# Patient Record
Sex: FEMALE | Race: WHITE | Employment: PART TIME | URBAN - METROPOLITAN AREA
[De-identification: names, ages, dates, MRNs, and addresses within clinical notes are randomized per-mention and may not be internally consistent; named-entity substitution may affect disease eponyms.]

---

## 2021-11-03 NOTE — PROGRESS NOTES
[address] [facility]  [phone]    Hematology / Oncology Consult    Reason for Visit:   Wing Kohler is a 49 y.o. female who is seen in consultation at the request of Dr. Netta Correa for evaluation of hypercoag workup. PCP is Dr. Angela Henriquez. History of Present Illness:   Wing Kohler is a 49 y.o. female who is seen for PE and hypercoag workup. Patient had left-sided lateral chest pain and left shoulder pain with pleuritic pattern. She was evaluated by her PCP who ordered chest CTA. Patient was diagnosed with a pulmonary emboli in RLL and LLL on 8/10/21 and was started on Xarelto. Reportedly a hypercoag workup was completed and was abnormal but done while on Xarelto. She was on oral contraceptives at time of PE diagnosis, but has since discontinued. No 8 hr long travel, surgeries, fractures or hospitalizations in the 6-8 weeks prior to diagnosis. No prior personal or family h/o VTE. She is very active during work. Of note, she was diagnosed with Celiac disease in 2017 and feeling much better after switching to gluten free diet. PMHx: Celiac disease  PSurgHx: None  FHx: No h/o VTE or clotting disorders. Father had prostate cancer. Mother gluten intolerant. SHx: Non smoker, occasional EtOH. No past medical history on file. No past surgical history on file. Social History     Tobacco Use    Smoking status: Never Smoker    Smokeless tobacco: Never Used   Substance Use Topics    Alcohol use: Not Currently     Comment: occassionally      No family history on file. No current outpatient medications on file. No current facility-administered medications for this visit. Allergies   Allergen Reactions    Gluten Other (comments)     Celiac Disease unable to contain medications or products containing gluten.  Penicillins Hives        Review of Systems: A complete review of systems was obtained, negative except as described above.     Physical Exam: Blood pressure (!) 138/91, pulse 77, temperature 97 °F (36.1 °C), resp. rate 16, height 5' 5\" (1.651 m), weight 182 lb 3.2 oz (82.6 kg), SpO2 98 %. ECOG PS: 0  General: Well developed, no acute distress  Eyes: PERRLA, EOMI, anicteric sclerae  HENT: Atraumatic, OP clear, TMs intact without erythema  Neck: Supple, no JVD or thyoemgaly  Lymphatic: No cervical, supraclavicular, axillary adenopathy  Respiratory: CTAB, normal respiratory effort  CV: Normal rate, regular rhythm, no murmurs, no peripheral edema  GI: Soft, nontender, nondistended, no masses, no hepatomegaly, no splenomegaly  MS: Normal gait and station. Digits without clubbing or cyanosis. Skin: No rashes, ecchymoses, or petechiae. Normal temperature, turgor, and texture. Neuro/Psych: Alert, oriented. 5/5 strength in all 4 extremities. Appropriate affect, normal judgment/insight. Results:   No results found for: WBC, HGB, HCT, PLT, MCV, ANEU, HGBPOC, HCTPOC, HGBEXT, HCTEXT, PLTEXT  No results found for: NA, K, CL, CO2, GLU, BUN, CREA, GFRAA, GFRNA, CA, NAPOC, KPOCT, CLPOC, GLUCPOC, IBUN, CREAPOC, ICAI  No results found for: TBILI, ALT, AP, TP, ALB, GLOB  No results found for: IRON, FE, TIBC, IBCT, PSAT, FERR    No results found for: B12LT, FOL, RBCF  No results found for: TSH, TSH2, TSH3, TSHP, TSHEXT  No results found for: HAMAT, HAAB, HABT, HAAT, HBSAG, HBSB, HBSAT, HBABN, HBCM, HBCAB, HBCAT, XBCABS, HBEAB, HBEAG, XHEPCS, [id number], HBEGLT, HBCMLT, HBCLT, HBEBLT, [id number], [id number], HAVMLT, [id number], HBCMLT, [id number], HCGAT    Outside labs 9/14/21:  Antithrombin %   []  Protein C 160%   [73 - 180%]  Protein S 84%   [%]  Factor V Leiden: Single [id number] mutation detected (Heterozygote)  Factor II / Prothrombin gene mutation negative  Lupus anticoagulant positive  Anticardiolipin negative  Beta-2-GP negative      Imaging: Outside chest CT 8/10/21: Pulmonary emboli in RLL and LLL. No right heart strain.       Assessment & Plan: Chu Floyd is a 49 y.o. female is seen for evaluation of PE and thrombophilia workup. 1. Provoked pulmonary emboli / Heterozygous Factor V Leiden mutation / Lupus anticoag positive:  Occurred while pt on oral contraceptives, now discontinued. Pt also found to have Heterozygous Factor V Leiden mutation, which makes this patient's risk of VTE is slightly higher than the general population. About 5% of patients with this mutation will have a VTE during their lifetime. Given first time thrombosis while on OCPs, I feel patient can safely take anticoagulation for 6 months and then discontinue. This heterozygous Factor V Leiden mutation would not warrant lifelong anticoagulation. However, patient was educated today about the risk factors of VTE that should be avoided, and the signs and symptoms of VTE that should warrant prompt medical attention. Prophylactic anticoagulation should be considered during high risk situations, such as surgery, hospitalizations. She was additional advised to avoid oral contraceptives. -- Continue anticoagulation to complete 6 months duration - Pt states she is NOT on Voltaren. -- Recheck lupus anticoagulant 12 weeks from last check, due 12/14/21 - will call pt with results  -- Follow up as needed, especially prior to surgeries. 2. Celiac disease:   Diagnosed 9/2017 and now feeling much better on gluten free diet. Taking iron supplements as well. 3. Need for contraception:  Advised against OCPs. Pt is currently using condoms since she may be perimenopausal now or soon. I appreciate the opportunity to participate in [facility].     Signed By: Lola Toscano MD     November 4, 2021

## 2021-11-04 ENCOUNTER — OFFICE VISIT (OUTPATIENT)
Dept: ONCOLOGY | Age: 51
End: 2021-11-04
Payer: COMMERCIAL

## 2021-11-04 VITALS
OXYGEN SATURATION: 98 % | SYSTOLIC BLOOD PRESSURE: 138 MMHG | HEIGHT: 65 IN | HEART RATE: 77 BPM | BODY MASS INDEX: 30.35 KG/M2 | TEMPERATURE: 97 F | DIASTOLIC BLOOD PRESSURE: 91 MMHG | WEIGHT: 182.2 LBS | RESPIRATION RATE: 16 BRPM

## 2021-11-04 DIAGNOSIS — K90.0 CELIAC DISEASE: ICD-10-CM

## 2021-11-04 DIAGNOSIS — D68.51 HETEROZYGOUS FACTOR V LEIDEN MUTATION (HCC): ICD-10-CM

## 2021-11-04 DIAGNOSIS — R76.0 LUPUS ANTICOAGULANT POSITIVE: ICD-10-CM

## 2021-11-04 DIAGNOSIS — Z30.09 ADVISED ABOUT ORAL CONTRACEPTION: ICD-10-CM

## 2021-11-04 DIAGNOSIS — I26.94 MULTIPLE SUBSEGMENTAL PULMONARY EMBOLI WITHOUT ACUTE COR PULMONALE (HCC): Primary | ICD-10-CM

## 2021-11-04 PROCEDURE — 99204 OFFICE O/P NEW MOD 45 MIN: CPT | Performed by: INTERNAL MEDICINE

## 2021-11-04 RX ORDER — RIVAROXABAN 20 MG/1
TABLET, FILM COATED ORAL
COMMUNITY
Start: 2021-09-05 | End: 2021-11-04 | Stop reason: SDUPTHER

## 2021-11-04 RX ORDER — DICLOFENAC SODIUM 75 MG/1
TABLET, DELAYED RELEASE ORAL
COMMUNITY
Start: 2021-08-04

## 2021-11-04 RX ORDER — RIVAROXABAN 20 MG/1
TABLET, FILM COATED ORAL
Qty: 30 TABLET | Refills: 3 | Status: SHIPPED | OUTPATIENT
Start: 2021-11-04

## 2021-11-04 NOTE — PATIENT INSTRUCTIONS
You were found to have heterozygous Factor V Leiden gene mutation, which slightly increases your risk of blood clots compared to the general population. I recommend your first degree family members also get tested for this gene mutation. If your family members are local, they are welcome to call our office at [phone] to make an appt to be seen and tested.

## 2021-11-04 NOTE — PROGRESS NOTES
Chief Complaint   Patient presents with    New Patient     Lacie Gregory is a pleasant 49year old woman who presents as a new patient for hypercoagulation.  She denies pain

## 2021-12-16 DIAGNOSIS — R76.0 LUPUS ANTICOAGULANT POSITIVE: ICD-10-CM

## 2021-12-16 DIAGNOSIS — I26.94 MULTIPLE SUBSEGMENTAL PULMONARY EMBOLI WITHOUT ACUTE COR PULMONALE (HCC): ICD-10-CM

## 2021-12-18 LAB
DRVVT MIX, 117894: 59.8 SEC (ref 0–40.4)
DRVVT RATIO 500585: 1.2 RATIO (ref 0.8–1.2)
INTERPRETATION, 117893: ABNORMAL
SCREEN APTT: 46.1 SEC (ref 0–51.9)
SCREEN DRVVT: 67.6 SEC (ref 0–47)

## 2021-12-20 NOTE — PROGRESS NOTES
The recent labs show that the lupus anticoagulant is no longer detectable which is good. We will stick with the plan of continuing the blood thinner for a total of 6 months.

## 2022-03-31 ENCOUNTER — TRANSCRIBE ORDER (OUTPATIENT)
Dept: SCHEDULING | Age: 52
End: 2022-03-31

## 2022-03-31 DIAGNOSIS — R06.02 SOB (SHORTNESS OF BREATH): Primary | ICD-10-CM

## 2022-04-14 ENCOUNTER — HOSPITAL ENCOUNTER (OUTPATIENT)
Dept: NON INVASIVE DIAGNOSTICS | Age: 52
Discharge: HOME | End: 2022-04-14
Attending: INTERNAL MEDICINE
Payer: COMMERCIAL

## 2022-04-14 VITALS
HEIGHT: 65 IN | DIASTOLIC BLOOD PRESSURE: 91 MMHG | SYSTOLIC BLOOD PRESSURE: 138 MMHG | WEIGHT: 182 LBS | BODY MASS INDEX: 30.32 KG/M2

## 2022-04-14 DIAGNOSIS — R06.02 SOB (SHORTNESS OF BREATH): ICD-10-CM

## 2022-04-14 LAB
ECHO AO ASC DIAM: 3 CM
ECHO AO ASCENDING AORTA INDEX: 1.58 CM/M2
ECHO AR MAX VEL PISA: 3.1 M/S
ECHO AV AREA PEAK VELOCITY: 3.9 CM2
ECHO AV AREA VTI: 3.8 CM2
ECHO AV AREA/BSA PEAK VELOCITY: 2.1 CM2/M2
ECHO AV AREA/BSA VTI: 2 CM2/M2
ECHO AV MEAN GRADIENT: 4 MMHG
ECHO AV MEAN VELOCITY: 0.9 M/S
ECHO AV PEAK GRADIENT: 8 MMHG
ECHO AV PEAK VELOCITY: 1.4 M/S
ECHO AV REGURGITANT PHT: 567.5 MILLISECOND
ECHO AV VELOCITY RATIO: 1.07
ECHO AV VTI: 32.5 CM
ECHO LA DIAMETER INDEX: 1.58 CM/M2
ECHO LA DIAMETER: 3 CM
ECHO LA VOL 2C: 39 ML (ref 22–52)
ECHO LA VOL 4C: 40 ML (ref 22–52)
ECHO LA VOL BP: 40 ML (ref 22–52)
ECHO LA VOL/BSA BIPLANE: 21 ML/M2 (ref 16–34)
ECHO LA VOLUME AREA LENGTH: 45 ML
ECHO LA VOLUME INDEX A2C: 21 ML/M2 (ref 16–34)
ECHO LA VOLUME INDEX A4C: 21 ML/M2 (ref 16–34)
ECHO LA VOLUME INDEX AREA LENGTH: 24 ML/M2 (ref 16–34)
ECHO LV E' LATERAL VELOCITY: 11 CM/S
ECHO LV E' SEPTAL VELOCITY: 8 CM/S
ECHO LV FRACTIONAL SHORTENING: 33 % (ref 28–44)
ECHO LV INTERNAL DIMENSION DIASTOLE INDEX: 2.37 CM/M2
ECHO LV INTERNAL DIMENSION DIASTOLIC: 4.5 CM (ref 3.9–5.3)
ECHO LV INTERNAL DIMENSION SYSTOLIC INDEX: 1.58 CM/M2
ECHO LV INTERNAL DIMENSION SYSTOLIC: 3 CM
ECHO LV IVSD: 0.9 CM (ref 0.6–0.9)
ECHO LV MASS 2D: 132.8 G (ref 67–162)
ECHO LV MASS INDEX 2D: 69.9 G/M2 (ref 43–95)
ECHO LV POSTERIOR WALL DIASTOLIC: 0.9 CM (ref 0.6–0.9)
ECHO LV RELATIVE WALL THICKNESS RATIO: 0.4
ECHO LVOT AREA: 3.5 CM2
ECHO LVOT AV VTI INDEX: 1.07
ECHO LVOT DIAM: 2.1 CM
ECHO LVOT MEAN GRADIENT: 5 MMHG
ECHO LVOT PEAK GRADIENT: 9 MMHG
ECHO LVOT PEAK VELOCITY: 1.5 M/S
ECHO LVOT STROKE VOLUME INDEX: 63.4 ML/M2
ECHO LVOT SV: 120.5 ML
ECHO LVOT VTI: 34.8 CM
ECHO MV A VELOCITY: 0.77 M/S
ECHO MV E DECELERATION TIME (DT): 178.7 MS
ECHO MV E VELOCITY: 1 M/S
ECHO MV E/A RATIO: 1.3
ECHO MV E/E' LATERAL: 9.09
ECHO MV E/E' RATIO (AVERAGED): 10.8
ECHO MV E/E' SEPTAL: 12.5
ECHO MV REGURGITANT PEAK GRADIENT: 108 MMHG
ECHO MV REGURGITANT PEAK VELOCITY: 5.2 M/S
ECHO PULMONARY ARTERY END DIASTOLIC PRESSURE: 3 MMHG
ECHO PV MAX VELOCITY: 0.9 M/S
ECHO PV PEAK GRADIENT: 3 MMHG
ECHO PV REGURGITANT MAX VELOCITY: 0.8 M/S
ECHO RV INTERNAL DIMENSION: 3.7 CM
ECHO RV TAPSE: 2.2 CM (ref 1.7–?)
ECHO RVOT PEAK GRADIENT: 2 MMHG
ECHO RVOT PEAK VELOCITY: 0.7 M/S
ECHO TV REGURGITANT MAX VELOCITY: 2.14 M/S
ECHO TV REGURGITANT PEAK GRADIENT: 18 MMHG

## 2022-04-14 PROCEDURE — 93306 TTE W/DOPPLER COMPLETE: CPT | Performed by: INTERNAL MEDICINE

## 2022-04-14 PROCEDURE — 93306 TTE W/DOPPLER COMPLETE: CPT

## 2023-04-18 ENCOUNTER — OFFICE VISIT (OUTPATIENT)
Dept: CARDIOLOGY CLINIC | Age: 53
End: 2023-04-18
Payer: COMMERCIAL

## 2023-04-18 VITALS
HEIGHT: 65 IN | SYSTOLIC BLOOD PRESSURE: 102 MMHG | BODY MASS INDEX: 31.52 KG/M2 | HEART RATE: 74 BPM | WEIGHT: 189.2 LBS | DIASTOLIC BLOOD PRESSURE: 80 MMHG | OXYGEN SATURATION: 98 %

## 2023-04-18 DIAGNOSIS — Z86.2 H/O FACTOR V LEIDEN MUTATION: ICD-10-CM

## 2023-04-18 DIAGNOSIS — R07.9 CHEST PAIN, UNSPECIFIED TYPE: Primary | ICD-10-CM

## 2023-04-18 DIAGNOSIS — I26.99 PULMONARY EMBOLISM, OTHER, UNSPECIFIED CHRONICITY, UNSPECIFIED WHETHER ACUTE COR PULMONALE PRESENT (HCC): ICD-10-CM

## 2023-04-18 PROCEDURE — 93000 ELECTROCARDIOGRAM COMPLETE: CPT | Performed by: INTERNAL MEDICINE

## 2023-04-18 PROCEDURE — 99204 OFFICE O/P NEW MOD 45 MIN: CPT | Performed by: INTERNAL MEDICINE

## 2023-04-18 RX ORDER — FLUTICASONE PROPIONATE AND SALMETEROL 250; 50 UG/1; UG/1
POWDER RESPIRATORY (INHALATION)
COMMUNITY
Start: 2023-03-01

## 2023-04-18 RX ORDER — LORATADINE 10 MG/1
CAPSULE, LIQUID FILLED ORAL
COMMUNITY
Start: 2023-03-01

## 2023-04-18 RX ORDER — MONTELUKAST SODIUM 10 MG/1
1 TABLET ORAL
COMMUNITY
Start: 2022-03-30

## 2023-04-18 NOTE — PROGRESS NOTES
Arik Villatoro January MD, MS, [address]             HISTORY OF PRESENT ILLNESS:    Hermila Dejesus is a 47 y.o. female referred for chest pain. PMH provoked pulmonary emboli (on OCT) / Heterozygous Factor V Leiden mutation / Lupus anticoag positive (repeated and was negative) 7/2021. Had pain on left side, progressed. Presented with CP, worse with inspiration. Ddimer elevated, proceed with CT scan, placed on Xarelto. Didn't need hospitalization. Took Xarelto for 6 months. Thoughts were the PE was provoked. Celiac disease. Joined "[employer]. ++ CP sharp mid sternal to left of sternal, at rest, no exertion. Random. Happens for a week or so, then lays off. Has not noted in the last two weeks. GM passed away. EKG today shows NSR, HR 66.    Echo 4/2022 - normal LV fxn, no valve disease, no PHTN. Labs requested. HDL reportedly elevated. Discussed CCS. SUMMARY:   Problem List  Date Reviewed: 4/18/2023            Codes Class Noted    Chest pain ICD-10-CM: R07.9  ICD-9-CM: 786.50  4/18/2023        [facility] ICD-10-CM: I26.99  ICD-9-CM: 415.19  4/18/2023        H/O factor V Leiden mutation ICD-10-CM: Z86.2  ICD-9-CM: V12.3  4/18/2023           Current Outpatient Medications on File Prior to Visit   Medication Sig    montelukast (SINGULAIR) 10 mg tablet Take 1 Tablet by mouth. fluticasone propion-salmeteroL (ADVAIR/WIXELA) 250-50 mcg/dose diskus inhaler Take  by inhalation. loratadine (Claritin Liqui-Gel) 10 mg cap Take  by mouth. No current facility-administered medications on file prior to visit. CARDIOLOGY STUDIES TO DATE:  No results found for this visit on 04/18/23.      04/14/22    ECHO ADULT COMPLETE 04/14/2022 4/14/2022    Interpretation Summary    Left Ventricle: Left ventricle size is normal. Normal wall thickness. Normal wall motion. Normal left ventricular systolic function with a visually estimated EF of 60 - 65%.  Normal diastolic function. Signed by: Kandi Almendarez MD on 4/14/2022  5:07 PM            CARDIAC ROS:   positive for chest pain    History reviewed. No pertinent past medical history. History reviewed. No pertinent family history. Social History     Socioeconomic History    Marital status:      Spouse name: Not on file    Number of children: Not on file    Years of education: Not on file    Highest education level: Not on file   Occupational History    Not on file   Tobacco Use    Smoking status: Never    Smokeless tobacco: Never   Substance and Sexual Activity    Alcohol use: Not Currently     Comment: occassionally    Drug use: Never    Sexual activity: Not on file   Other Topics Concern    Not on file   Social History Narrative    Not on file     Social Determinants of Health     Financial Resource Strain: Not on file   Food Insecurity: Not on file   Transportation Needs: Not on file   Physical Activity: Not on file   Stress: Not on file   Social Connections: Not on file   Intimate Partner Violence: Not on file   Housing Stability: Not on file        GENERAL ROS:  A comprehensive review of systems was negative except for that written in the HPI.     Visit Vitals  /80 (BP 1 Location: Left arm, BP Patient Position: Sitting)   Pulse 74   Ht 5' 5\" (1.651 m)   Wt 85.8 kg (189 lb 3.2 oz)   SpO2 98%   BMI 31.48 kg/m²     Vitals:    04/18/23 0841   BP: 102/80   Pulse: 74   SpO2: 98%   Weight: 85.8 kg (189 lb 3.2 oz)   Height: 5' 5\" (1.651 m)        Wt Readings from Last 3 Encounters:   04/18/23 85.8 kg (189 lb 3.2 oz)   04/14/22 82.6 kg (182 lb)   11/04/21 82.6 kg (182 lb 3.2 oz)            BP Readings from Last 3 Encounters:   04/18/23 102/80   04/14/22 (!) 138/91   11/04/21 (!) 138/91       PHYSICAL EXAM  General appearance: alert, cooperative, no distress, appears stated age  Neck: supple, symmetrical, trachea midline, no adenopathy, thyroid: not enlarged, symmetric, no tenderness/mass/nodules, no carotid bruit, and no JVD  Lungs: clear to auscultation bilaterally  Heart: regular rate and rhythm, S1, S2 normal, no murmur, click, rub or gallop  Extremities: extremities normal, atraumatic, no cyanosis or edema    No results found for: CHOL, CHOLX, CHLST, CHOLV, [id number], HDL, HDLP, LDL, LDLC, DLDLP, TGLX, TRIGL, TRIGP, CHHD, CHHDX    No results found for: WBC, WBCLT, HGBPOC, HGB, HGBP, HCTPOC, HCT, PHCT, RBCH, PLT, MCV, HGBEXT, HCTEXT, PLTEXT, HGBEXT, HCTEXT, PLTEXT     No results found for: CHOL, CHOLPOCT, CHOLX, CHLST, CHOLV, HDL, HDLP, LDL, LDLC, DLDLP, TGLX, TRIGL, TRIGP, TGLPOCT, NTGLT, CHHD, CHHDX     ASSESSMENT  Diagnoses and all orders for this visit:    1. Chest pain, unspecified type  -     AMB POC EKG ROUTINE W/ 12 LEADS, INTER & REP  -     CT HEART W/O CONT WITH CALCIUM; Future    2. Pulmonary embolism, other, unspecified chronicity, unspecified whether acute cor pulmonale present ([facility]  -     CT HEART W/O CONT WITH CALCIUM; Future    3. H/O factor V Leiden mutation  -     CT HEART W/O CONT WITH CALCIUM; Future         Encounter Diagnoses   Name Primary? Chest pain, unspecified type Yes    Pulmonary embolism, other, unspecified chronicity, unspecified whether acute cor pulmonale present ([facility]     H/O factor V Leiden mutation      Orders Placed This Encounter    CT HEART W/O CONT WITH CALCIUM    AMB POC EKG ROUTINE W/ 12 LEADS, INTER & REP    montelukast (SINGULAIR) 10 mg tablet    fluticasone propion-salmeteroL (ADVAIR/WIXELA) 250-50 mcg/dose diskus inhaler    loratadine (Claritin Liqui-Gel) 10 mg cap       Plan      Follow-up and Dispositions    Return if symptoms worsen or fail to improve.          Armand Figueroa MD  4/18/2023        [address]   [address], [address] [place] [address]  [phone] (F)    [address]  [phone] (P)  [phone] (F)    ATTENTION:   This medical record was transcribed using an electronic medical records/speech recognition system. Although proofread, it may and can contain electronic, spelling and other errors. Corrections may be executed at a later time. Please feel free to contact us for any clarifications as needed.

## 2023-04-18 NOTE — PROGRESS NOTES
Jennifer Montgomery is a 47 y.o. female    Chief Complaint   Patient presents with    New Patient    Chest Pain     PE       Vitals:    04/18/23 0841   BP: 102/80   BP 1 Location: Left arm   BP Patient Position: Sitting   Pulse: 74   Height: 5' 5\" (1.651 m)   Weight: 189 lb 3.2 oz (85.8 kg)   SpO2: 98%       Chest pain no    SOB no    Dizziness no    Swelling no    Refills no      1. Have you been to the ER, urgent care clinic since your last visit? Hospitalized since your last visit? No    2. Have you seen or consulted any other health care providers outside of the [address] [place] [address] since your last visit? Include any pap smears or colon screening.  No

## 2023-04-23 DIAGNOSIS — I26.99 PULMONARY EMBOLISM, OTHER, UNSPECIFIED CHRONICITY, UNSPECIFIED WHETHER ACUTE COR PULMONALE PRESENT (HCC): ICD-10-CM

## 2023-04-23 DIAGNOSIS — R07.9 CHEST PAIN, UNSPECIFIED TYPE: Primary | ICD-10-CM

## 2023-05-04 ENCOUNTER — TELEPHONE (OUTPATIENT)
Dept: CARDIOLOGY CLINIC | Age: 53
End: 2023-05-04

## 2023-05-09 ENCOUNTER — HOSPITAL ENCOUNTER (OUTPATIENT)
Facility: HOSPITAL | Age: 53
Discharge: HOME | End: 2023-05-12

## 2023-05-09 DIAGNOSIS — Z86.2 H/O FACTOR V LEIDEN MUTATION: ICD-10-CM

## 2023-05-09 DIAGNOSIS — R07.9 CHEST PAIN, UNSPECIFIED TYPE: ICD-10-CM

## 2023-05-09 DIAGNOSIS — I26.99 PULMONARY EMBOLISM, OTHER, UNSPECIFIED CHRONICITY, UNSPECIFIED WHETHER ACUTE COR PULMONALE PRESENT (HCC): ICD-10-CM

## 2023-05-09 PROCEDURE — 75571 CT HRT W/O DYE W/CA TEST: CPT

## 2023-05-09 NOTE — RESULT ENCOUNTER NOTE
Good news! Your coronary calcium score was ZERO, which is very low risk. Please call with office with further questions.     Dr. Mimi Cee